# Patient Record
Sex: MALE | Race: WHITE | NOT HISPANIC OR LATINO | Employment: OTHER | URBAN - METROPOLITAN AREA
[De-identification: names, ages, dates, MRNs, and addresses within clinical notes are randomized per-mention and may not be internally consistent; named-entity substitution may affect disease eponyms.]

---

## 2019-06-03 PROBLEM — L98.9 SKIN LESION OF RIGHT ARM: Status: ACTIVE | Noted: 2019-06-03

## 2019-06-03 PROCEDURE — 88305 TISSUE EXAM BY PATHOLOGIST: CPT | Performed by: PATHOLOGY

## 2019-06-18 PROCEDURE — 88305 TISSUE EXAM BY PATHOLOGIST: CPT | Performed by: PATHOLOGY

## 2019-07-06 PROBLEM — L98.9 SKIN LESION OF RIGHT ARM: Status: RESOLVED | Noted: 2019-06-03 | Resolved: 2019-07-06

## 2019-11-13 ENCOUNTER — EVALUATION (OUTPATIENT)
Dept: PHYSICAL THERAPY | Age: 74
End: 2019-11-13
Payer: COMMERCIAL

## 2019-11-13 DIAGNOSIS — M25.551 RIGHT HIP PAIN: Primary | ICD-10-CM

## 2019-11-13 PROCEDURE — 97162 PT EVAL MOD COMPLEX 30 MIN: CPT | Performed by: PHYSICAL THERAPIST

## 2019-11-13 NOTE — PROGRESS NOTES
PT Evaluation     Today's date: 2019  Patient name: Ofe Govea  :   MRN: [id number]  Referring provider: Maddy Michael MD  Dx:   Encounter Diagnosis     ICD-10-CM    1  Right hip pain M25 551                   Assessment  Assessment details: Severe R hip PROM/AROM limitations - needs further medical intervention  Scheduled to R THR and f/u PT 3 days later depending on recovery  Impairments: abnormal or restricted ROM, impaired physical strength, lacks appropriate home exercise program and pain with function  Understanding of Dx/Px/POC: excellent  Goals  Short Term goals - 4 weeks  1  Patient will be independent HEP  2   Patient will report a 50% decrease in pain complaints  3   Increase strength 1/2 grade  4   Increase ROM 5-10 degrees  Long Term goals - 8 weeks  1  Patient will report elimination of pain complaints  2   Patient will return to all work related activities without restriction  3   Patient will return to all recreational activities without restriction  4   ROM WFL  5   Strength 5/5  Plan  Planned therapy interventions: manual therapy and strengthening  Frequency: 3x week  Duration in weeks: 6        Subjective Evaluation    History of Present Illness  Mechanism of injury: Patient scheduled for R THR on 19  Patient had L THR approx 2 years ago      Quality of life: excellent    Pain  Current pain ratin  At best pain ratin  At worst pain ratin  Quality: sharp  Aggravating factors: stair climbing, standing and walking  Progression: worsening    Patient Goals  Patient goals for therapy: increased strength, independence with ADLs/IADLs, increased motion and decreased pain          Objective     Passive Range of Motion     Right Hip   Flexion: 80 degrees with pain  Extension: -5 degrees with pain  Abduction: 0 degrees with pain  External rotation (prone): 15 degrees with pain  Internal rotation (prone): -10 degrees with pain    Strength/Myotome Testing     Right Hip   Planes of Motion   Flexion: 2+  Abduction: 2+  External rotation: 2+  Internal rotation: 2+             Precautions: Anterior THR precautions      Manual                           LC             Prone quad stretch                                           Exercise Diary                                        SLR flexion             SLR abd                          Heel slides with hip flexion                          Standing hip flexion             Standing hip abd                          Standing knee flexion                                                                                                                                      Modalities

## 2020-01-10 ENCOUNTER — OFFICE VISIT (OUTPATIENT)
Dept: PHYSICAL THERAPY | Age: 75
End: 2020-01-10
Payer: COMMERCIAL

## 2020-01-10 DIAGNOSIS — M25.551 RIGHT HIP PAIN: Primary | ICD-10-CM

## 2020-01-10 PROCEDURE — 97164 PT RE-EVAL EST PLAN CARE: CPT | Performed by: PHYSICAL THERAPIST

## 2020-01-10 PROCEDURE — 97110 THERAPEUTIC EXERCISES: CPT | Performed by: PHYSICAL THERAPIST

## 2020-01-10 NOTE — PROGRESS NOTES
Daily Note     Today's date: 1/10/2020  Patient name: Marco Marin  :   MRN: [id number]  Referring provider: Eva Anglin MD  Dx:   Encounter Diagnosis     ICD-10-CM    1  Right hip pain M25 551                   Subjective: Significant stiffness noted  Objective: See treatment diary below      Assessment: Tolerated treatment fair  Patient would benefit from continued PT      Plan: Continue per plan of care        Precautions: Anterior THR precautions      Manual  1/10                         LC nt            Prone quad stretch nt                                          Exercise Diary                                        SLR flexion nt            SLR abd nt                         Heel slides with hip flexion Hell slides with strap - 2x10                         Standing hip flexion nt            Standing hip abd nt                         Standing knee flexion nt                         Supine hip abd 2x10            Bridging 2x10            Isometric hip add 2x10            Parallel bars sidestepping             Fwd/bck             squats - 2x10                                          Modalities

## 2020-01-13 ENCOUNTER — OFFICE VISIT (OUTPATIENT)
Dept: PHYSICAL THERAPY | Age: 75
End: 2020-01-13
Payer: COMMERCIAL

## 2020-01-13 DIAGNOSIS — M25.551 RIGHT HIP PAIN: Primary | ICD-10-CM

## 2020-01-13 PROCEDURE — 97110 THERAPEUTIC EXERCISES: CPT | Performed by: PHYSICAL THERAPIST

## 2020-01-13 PROCEDURE — 97140 MANUAL THERAPY 1/> REGIONS: CPT | Performed by: PHYSICAL THERAPIST

## 2020-01-13 PROCEDURE — 97112 NEUROMUSCULAR REEDUCATION: CPT | Performed by: PHYSICAL THERAPIST

## 2020-01-13 NOTE — PROGRESS NOTES
Daily Note     Today's date: 2020  Patient name: Heaven Singh  :   MRN: [id number]  Referring provider: Huong Love MD  Dx:   Encounter Diagnosis     ICD-10-CM    1  Right hip pain M25 551                   Subjective: Stiffness noted      Objective: See treatment diary below      Assessment: Tolerated treatment well  Patient would benefit from continued PT      Plan: Continue per plan of care  Precautions: Anterior THR precautions      Manual  1/10 1/13                        LC nt Nustep-10 minutes           Prone quad stretch nt x5 reps hold 30 sec                                           Exercise Diary                Prone knee flexion - 3x10                        SLR flexion nt            SLR abd nt                         Heel slides with hip flexion Hell slides with strap - 2x10 2x10                        Standing hip flexion nt nt           Standing hip abd nt nt                        Standing knee flexion nt nt                        Supine hip abd 2x10 3x10           Bridging 2x10 3x10           Isometric hip add 2x10 3x10           Parallel bars sidestepping 3x10            Fwd/bck             squats - 2x10              Step-ups - 3x10                            Modalities Imiquimod Counseling:  I discussed with the patient the risks of imiquimod including but not limited to erythema, scaling, itching, weeping, crusting, and pain.  Patient understands that the inflammatory response to imiquimod is variable from person to person and was educated regarded proper titration schedule.  If flu-like symptoms develop, patient knows to discontinue the medication and contact us.

## 2020-01-15 ENCOUNTER — OFFICE VISIT (OUTPATIENT)
Dept: PHYSICAL THERAPY | Age: 75
End: 2020-01-15
Payer: COMMERCIAL

## 2020-01-15 DIAGNOSIS — M25.551 RIGHT HIP PAIN: Primary | ICD-10-CM

## 2020-01-15 PROCEDURE — 97140 MANUAL THERAPY 1/> REGIONS: CPT | Performed by: PHYSICAL THERAPIST

## 2020-01-15 PROCEDURE — 97110 THERAPEUTIC EXERCISES: CPT | Performed by: PHYSICAL THERAPIST

## 2020-01-15 NOTE — PROGRESS NOTES
Daily Note     Today's date: 1/15/2020  Patient name: Carole Ferguson  :   MRN: [id number]  Referring provider: Joe Luu MD  Dx:   Encounter Diagnosis     ICD-10-CM    1  Right hip pain M25 551                   Subjective: Feeling better      Objective: See treatment diary below      Assessment: Tolerated treatment well  Patient would benefit from continued PT      Plan: Continue per plan of care  Precautions: Anterior THR precautions      Manual  1/10 1/13 1/15                       LC nt Nustep-10 minutes x10 minutes          Prone quad stretch nt x5 reps hold 30 sec   x5 - gentle                                        Exercise Diary                Prone knee flexion - 3x10 3x10                       SLR flexion nt            SLR abd nt                         Heel slides with hip flexion Hell slides with strap - 2x10 2x10 3x10                       Standing hip flexion nt nt           Standing hip abd nt nt                        Standing knee flexion nt nt                        Supine hip abd 2x10 3x10 3x10          Bridging 2x10 3x10 3x10          Isometric hip add 2x10 3x10 3x10          Parallel bars sidestepping 3x10 3x10           Fwd/bck             squats - 2x10              Step-ups - 3x10 3x10                           Modalities

## 2020-01-17 ENCOUNTER — OFFICE VISIT (OUTPATIENT)
Dept: PHYSICAL THERAPY | Age: 75
End: 2020-01-17
Payer: COMMERCIAL

## 2020-01-17 DIAGNOSIS — M25.551 RIGHT HIP PAIN: Primary | ICD-10-CM

## 2020-01-17 PROCEDURE — 97110 THERAPEUTIC EXERCISES: CPT | Performed by: PHYSICAL THERAPIST

## 2020-01-17 NOTE — PROGRESS NOTES
Daily Note     Today's date: 2020  Patient name: Damian Coronel  :   MRN: [id number]  Referring provider: Jose Francisco Cormier MD  Dx:   Encounter Diagnosis     ICD-10-CM    1  Right hip pain M25 551                   Subjective: Significant increase in sx's after last visit - mostly LB stiffness      Objective: See treatment diary below      Assessment: Tolerated treatment well  Patient would benefit from continued PT      Plan: Continue per plan of care  Precautions: Anterior THR precautions      Manual  1/10 1/13 1/15 1/17                      LC nt Nustep-10 minutes x10 minutes x10 minutes         Prone quad stretch nt x5 reps hold 30 sec   x5 - gentle nt                                       Exercise Diary                Prone knee flexion - 3x10 3x10 nt                      SLR flexion nt            SLR abd nt                         Heel slides with hip flexion Hell slides with strap - 2x10 2x10 3x10 completed                      Standing hip flexion nt nt  nt         Standing hip abd nt nt  nt                      Standing knee flexion nt nt  nt                      Supine hip abd 2x10 3x10 3x10 3x10         Bridging 2x10 3x10 3x10 3x10         Isometric hip add 2x10 3x10 3x10 3x10         Parallel bars sidestepping 3x10 3x10 3x10          Fwd/bck             squats - 2x10              Step-ups - 3x10 3x10 3x10                          Modalities

## 2020-01-20 ENCOUNTER — OFFICE VISIT (OUTPATIENT)
Dept: PHYSICAL THERAPY | Age: 75
End: 2020-01-20
Payer: COMMERCIAL

## 2020-01-20 DIAGNOSIS — M25.551 RIGHT HIP PAIN: Primary | ICD-10-CM

## 2020-01-20 PROCEDURE — 97110 THERAPEUTIC EXERCISES: CPT | Performed by: PHYSICAL THERAPIST

## 2020-01-20 PROCEDURE — 97112 NEUROMUSCULAR REEDUCATION: CPT | Performed by: PHYSICAL THERAPIST

## 2020-01-20 NOTE — PROGRESS NOTES
Daily Note     Today's date: 2020  Patient name: Nancy Vilchis  :   MRN: [id number]  Referring provider: Benoit Manriquez MD  Dx:   Encounter Diagnosis     ICD-10-CM    1  Right hip pain M25 551                   Subjective: Anterior hip tightness b/l and posterior R hip soreness noted  Objective: See treatment diary below      Assessment: Tolerated treatment well  Patient would benefit from continued PT      Plan: Continue per plan of care  Precautions: Anterior THR precautions      Manual  1/10 1/13 1/15 1/17 1/20                     LC nt Nustep-10 minutes x10 minutes x10 minutes x10 minutes        Prone quad stretch nt x5 reps hold 30 sec   x5 - gentle nt nt                                      Exercise Diary                Prone knee flexion - 3x10 3x10 nt nt                     SLR flexion nt    standing at parallel bars        SLR abd nt    standing at parallel bars                     Heel slides with hip flexion Hell slides with strap - 2x10 2x10 3x10 completed 3x10                                                                                      Supine hip abd 2x10 3x10 3x10 3x10 3x10        Bridging 2x10 3x10 3x10 3x10 3x10        Isometric hip add 2x10 3x10 3x10 3x10 3x10        Parallel bars sidestepping 3x10 3x10 3x10 3x10         Fwd/bck             squats - 2x10              Step-ups - 3x10 3x10 3x10 3x10                         Modalities

## 2020-01-22 ENCOUNTER — OFFICE VISIT (OUTPATIENT)
Dept: PHYSICAL THERAPY | Age: 75
End: 2020-01-22
Payer: COMMERCIAL

## 2020-01-22 DIAGNOSIS — M25.551 RIGHT HIP PAIN: Primary | ICD-10-CM

## 2020-01-22 PROCEDURE — 97140 MANUAL THERAPY 1/> REGIONS: CPT | Performed by: PHYSICAL THERAPIST

## 2020-01-22 PROCEDURE — 97110 THERAPEUTIC EXERCISES: CPT | Performed by: PHYSICAL THERAPIST

## 2020-01-22 NOTE — PROGRESS NOTES
Daily Note     Today's date: 2020  Patient name: Ofe Govea  :   MRN: [id number]  Referring provider: Maddy Michael MD  Dx:   Encounter Diagnosis     ICD-10-CM    1  Right hip pain M25 551                   Subjective: Feeling better      Objective: See treatment diary below  Staples removed - incision is approx along entire length  No redness noted  No significant bleeding noted  PROM:  R hip flexion: 90, ext: -5, IR: 10, ER: 10   Strength:  R hip flexion 2+/5, abd 3/5, ext 3+/5  Gait:  Using SPC - deviations noted  Subjective sx's:  Anterior tightness, soreness, and pain persist - 5/10 at worse    Short Term goals - 4 weeks  1  Patient will be independent HEP  2   Patient will report a 50% decrease in pain complaints  3   Increase strength 1/2 grade  4   Increase ROM 5-10 degrees  Long Term goals - 8 weeks  1  Patient will report elimination of pain complaints  2   Patient will return to all work related activities without restriction  3   Patient will return to all recreational activities without restriction  4   ROM WFL  5   Strength 5/5  Assessment: Tolerated treatment well  Patient would benefit from continued PT      Plan: Continue per plan of care  Precautions: Anterior THR precautions      Manual  1/10 1/13 1/15 1/17 1/20 1/22                    LC nt Nustep-10 minutes x10 minutes x10 minutes x10 minutes x10 minutes       Prone quad stretch nt x5 reps hold 30 sec   x5 - gentle nt nt x5 reps                                     Exercise Diary                Prone knee flexion - 3x10 3x10 nt nt                     SLR flexion nt    standing at parallel bars 3x10       SLR abd nt    standing at parallel bars 3x10             SLS on blue foam - 10 reps       Heel slides with hip flexion Hell slides with strap - 2x10 2x10 3x10 completed 3x10 3x10                                                                                     Supine hip abd 2x10 3x10 3x10 3x10 3x10 hooklying       Bridging 2x10 3x10 3x10 3x10 3x10 3x10       Isometric hip add 2x10 3x10 3x10 3x10 3x10 3x10       Parallel bars sidestepping 3x10 3x10 3x10 3x10 3x10        Fwd/bck             squats - 2x10              Step-ups - 3x10 3x10 3x10 3x10 3x10                        Modalities

## 2020-01-29 ENCOUNTER — OFFICE VISIT (OUTPATIENT)
Dept: PHYSICAL THERAPY | Age: 75
End: 2020-01-29
Payer: COMMERCIAL

## 2020-01-29 DIAGNOSIS — M25.551 RIGHT HIP PAIN: Primary | ICD-10-CM

## 2020-01-29 NOTE — PROGRESS NOTES
Daily Note     Today's date: 2020  Patient name: Brigida Lee  :   MRN: [id number]  Referring provider: Art Licona MD  Dx:   Encounter Diagnosis     ICD-10-CM    1  Right hip pain M25 551                   Subjective: Feeling good  Objective: See treatment diary below      Assessment: Tolerated treatment well  Patient would benefit from continued PT      Plan: Continue per plan of care  Precautions: Anterior THR precautions      Manual  1/10 1/13 1/15 1/17 1/20 1/22 1/29                   LC nt Nustep-10 minutes x10 minutes x10 minutes x10 minutes x10 minutes x10 minutes      Prone quad stretch nt x5 reps hold 30 sec   x5 - gentle nt nt x5 reps x5             Gentle MRE's abd - 2x12                       Exercise Diary                Prone knee flexion - 3x10 3x10 nt nt  3x10                   SLR flexion nt    standing at parallel bars 3x10 3# - 3x10      SLR abd nt    standing at parallel bars 3x10 3# - 3x10            SLS on blue foam - 10 reps x10 reps      Heel slides with hip flexion Hell slides with strap - 2x10 2x10 3x10 completed 3x10 3x10 3x10                                                                                    Supine hip abd 2x10 3x10 3x10 3x10 3x10 hooklying 3x10      Bridging 2x10 3x10 3x10 3x10 3x10 3x10 3x10      Isometric hip add 2x10 3x10 3x10 3x10 3x10 3x10 3x10      Parallel bars sidestepping 3x10 3x10 3x10 3x10 3x10 nt       Fwd/bck             squats - 2x10              Step-ups - 3x10 3x10 3x10 3x10 3x10 3x10                       Modalities

## 2020-01-31 ENCOUNTER — OFFICE VISIT (OUTPATIENT)
Dept: PHYSICAL THERAPY | Age: 75
End: 2020-01-31
Payer: COMMERCIAL

## 2020-01-31 DIAGNOSIS — M25.551 RIGHT HIP PAIN: Primary | ICD-10-CM

## 2020-01-31 PROCEDURE — 97140 MANUAL THERAPY 1/> REGIONS: CPT | Performed by: PHYSICAL THERAPIST

## 2020-01-31 PROCEDURE — 97110 THERAPEUTIC EXERCISES: CPT | Performed by: PHYSICAL THERAPIST

## 2020-01-31 NOTE — PROGRESS NOTES
Daily Note     Today's date: 2020  Patient name: Bola Patterson  :   MRN: [id number]  Referring provider: Nacho Talavera MD  Dx:   Encounter Diagnosis     ICD-10-CM    1  Right hip pain M25 551                 Subjective: Stiff when first getting up      Objective: See treatment diary below      Assessment: Tolerated treatment well  Patient would benefit from continued PT      Plan: Continue per plan of care  Precautions: Anterior THR precautions      Manual  1/10 1/13 1/15 1/17 1/20 1/22 1/29 1/31                  LC nt Nustep-10 minutes x10 minutes x10 minutes x10 minutes x10 minutes x10 minutes x10 minutes     Prone quad stretch nt x5 reps hold 30 sec   x5 - gentle nt nt x5 reps x5 x5            Gentle MRE's abd - 2x12 2x12                      Exercise Diary                Prone knee flexion - 3x10 3x10 nt nt  3x10 3x10                  SLR flexion nt    standing at parallel bars 3x10 3# - 3x10 3#     SLR abd nt    standing at parallel bars 3x10 3# - 3x10 3#           SLS on blue foam - 10 reps x10 reps x10     Heel slides with hip flexion Hell slides with strap - 2x10 2x10 3x10 completed 3x10 3x10 3x10 3x10                                                                                   Supine hip abd 2x10 3x10 3x10 3x10 3x10 hooklying 3x10 3x10     Bridging 2x10 3x10 3x10 3x10 3x10 3x10 3x10 3x10     Isometric hip add 2x10 3x10 3x10 3x10 3x10 3x10 3x10 3x10     Parallel bars sidestepping 3x10 3x10 3x10 3x10 3x10 nt nt      Fwd/bck             squats - 2x10              Step-ups - 3x10 3x10 3x10 3x10 3x10 3x10 3x10                      Modalities

## 2020-02-03 ENCOUNTER — APPOINTMENT (OUTPATIENT)
Dept: PHYSICAL THERAPY | Age: 75
End: 2020-02-03
Payer: COMMERCIAL

## 2020-02-04 ENCOUNTER — OFFICE VISIT (OUTPATIENT)
Dept: PHYSICAL THERAPY | Age: 75
End: 2020-02-04
Payer: COMMERCIAL

## 2020-02-04 DIAGNOSIS — M25.551 RIGHT HIP PAIN: Primary | ICD-10-CM

## 2020-02-04 PROCEDURE — 97110 THERAPEUTIC EXERCISES: CPT | Performed by: PHYSICAL THERAPIST

## 2020-02-04 PROCEDURE — 97140 MANUAL THERAPY 1/> REGIONS: CPT | Performed by: PHYSICAL THERAPIST

## 2020-02-04 NOTE — PROGRESS NOTES
Daily Note     Today's date: 2020  Patient name: Ayah Mattson  :   MRN: [id number]  Referring provider: Raven Ko MD  Dx:   Encounter Diagnosis     ICD-10-CM    1  Right hip pain M25 551                   Subjective: Feeling good  Objective: See treatment diary below      Assessment: Tolerated treatment well  Patient would benefit from continued PT      Plan: Continue per plan of care  Precautions: Anterior THR precautions      Manual  1/10 1/13 1/15 1/17 1/20 1/22 1/29 1/31 2                 LC nt Nustep-10 minutes x10 minutes x10 minutes x10 minutes x10 minutes x10 minutes x10 minutes 10 minutes    Prone quad stretch nt x5 reps hold 30 sec   x5 - gentle nt nt x5 reps x5 x5 x5           Gentle MRE's abd - 2x12 2x12 2x12             SLR with assist - 3x5        Exercise Diary                Prone knee flexion - 3x10 3x10 nt nt  3x10 3x10 3x10                 SLR flexion nt    standing at parallel bars 3x10 3# - 3x10 3# nt    SLR abd nt    standing at parallel bars 3x10 3# - 3x10 3# nt          SLS on blue foam - 10 reps x10 reps x10 x10 reps    Heel slides with hip flexion Hell slides with strap - 2x10 2x10 3x10 completed 3x10 3x10 3x10 3x10 nt                                                                                  Supine hip abd 2x10 3x10 3x10 3x10 3x10 hooklying 3x10 3x10 nt    Bridging 2x10 3x10 3x10 3x10 3x10 3x10 3x10 3x10 3x10    Isometric hip add 2x10 3x10 3x10 3x10 3x10 3x10 3x10 3x10 nt    Parallel bars sidestepping 3x10 3x10 3x10 3x10 3x10 nt nt nt     Fwd/bck             squats - 2x10              Step-ups - 3x10 3x10 3x10 3x10 3x10 3x10 3x10 3x10             Biodex - 2x10 squat and hold        Modalities

## 2020-02-06 ENCOUNTER — OFFICE VISIT (OUTPATIENT)
Dept: PHYSICAL THERAPY | Age: 75
End: 2020-02-06
Payer: COMMERCIAL

## 2020-02-06 DIAGNOSIS — M25.551 RIGHT HIP PAIN: Primary | ICD-10-CM

## 2020-02-06 PROCEDURE — 97140 MANUAL THERAPY 1/> REGIONS: CPT | Performed by: PHYSICAL THERAPIST

## 2020-02-06 PROCEDURE — 97110 THERAPEUTIC EXERCISES: CPT | Performed by: PHYSICAL THERAPIST

## 2020-02-06 NOTE — PROGRESS NOTES
Daily Note     Today's date: 2020  Patient name: Heaven Singh  :   MRN: [id number]  Referring provider: Huong Love MD  Dx:   Encounter Diagnosis     ICD-10-CM    1  Right hip pain M25 551                   Subjective: Patient continues to note improvement  Objective: See treatment diary below      Assessment: Tolerated treatment well  Patient would benefit from continued PT      Plan: Continue per plan of care  Precautions: Anterior THR precautions      Manual  1/10 1/13 1/15 1/17 1/20 1/22 1/29 1/31 2 2/                LC nt Nustep-10 minutes x10 minutes x10 minutes x10 minutes x10 minutes x10 minutes x10 minutes 10 minutes x10 minutes   Prone quad stretch nt x5 reps hold 30 sec   x5 - gentle nt nt x5 reps x5 x5 x5 completed          Gentle MRE's abd - 2x12 2x12 2x12 2x12            SLR with assist - 3x5 completed       Exercise Diary                Prone knee flexion - 3x10 3x10 nt nt  3x10 3x10 3x10 With active hip ext after stretch - 2x15             SLR abd - 2x15                                   SLS on blue foam - 10 reps x10 reps x10 x10 reps x10 reps - hold 10 sec                                                                                                           Bridging 2x10 3x10 3x10 3x10 3x10 3x10 3x10 3x10 3x10 With single limb lowering - 3x10   Isometric hip add 2x10 3x10 3x10 3x10 3x10 3x10 3x10 3x10 nt 3x10 with ball                                            Step-ups - 3x10 3x10 3x10 3x10 3x10 3x10 3x10 3x10 3x10 - 8"            Biodex - 2x10 squat and hold completed       Modalities

## 2020-02-10 ENCOUNTER — OFFICE VISIT (OUTPATIENT)
Dept: PHYSICAL THERAPY | Age: 75
End: 2020-02-10
Payer: COMMERCIAL

## 2020-02-10 DIAGNOSIS — M25.551 RIGHT HIP PAIN: Primary | ICD-10-CM

## 2020-02-10 PROCEDURE — 97110 THERAPEUTIC EXERCISES: CPT

## 2020-02-10 PROCEDURE — 97140 MANUAL THERAPY 1/> REGIONS: CPT

## 2020-02-10 PROCEDURE — 97112 NEUROMUSCULAR REEDUCATION: CPT

## 2020-02-10 NOTE — PROGRESS NOTES
Daily Note     Today's date: 2/10/2020  Patient name: Ayse Justice  :   MRN: [id number]  Referring provider: Aviva Harmon MD  Dx:   Encounter Diagnosis     ICD-10-CM    1  Right hip pain M25 551                   Subjective: Pt states he is feeling pretty good over all with no new complaints from last session  Objective: See treatment diary below      Assessment: Tolerated treatment well with no increase in pain  Pt continues to required min to moderate assistance with SLR  Pt demonstrates improve balance as he demonstrates less LOB with SLS  Patient would benefit from continued PT      Plan: Continue per plan of care        Precautions: Anterior THR precautions      Manual  1/17 1/20 1/22 1/29 1/31 2/4 2/6 2/10              LC x10 minutes x10 minutes x10 minutes x10 minutes x10 minutes 10 minutes x10 minutes 10 mins   Prone quad stretch nt nt x5 reps x5 x5 x5 completed completed        Gentle MRE's abd - 2x12 2x12 2x12 2x12 2  x12          SLR with assist - 3x5 completed completed        Exercise Diary             nt nt  3x10 3x10 3x10 With active hip ext after stretch - 2x15 Prone knee flexion   with active hip ext after stretch - 2x15          SLR abd - 2x15 SLR abd - 2x15                            SLS on blue foam - 10 reps x10 reps x10 x10 reps x10 reps - hold 10 sec x10 reps - hold 10 sec                                                                                           Bridging 3x10 3x10 3x10 3x10 3x10 3x10 With single limb lowering - 3x10 With single limb lowering - 3x10   Isometric hip add 3x10 3x10 3x10 3x10 3x10 nt 3x10 with ball 3x10 with ball                                     3x10 3x10 3x10 3x10 3x10 3x10 3x10 - 8" 3  x10 8"         Biodex - 2x10 squat and hold completed completed        Modalities

## 2020-02-13 ENCOUNTER — APPOINTMENT (OUTPATIENT)
Dept: PHYSICAL THERAPY | Age: 75
End: 2020-02-13
Payer: COMMERCIAL

## 2022-01-18 ENCOUNTER — CONSULT (OUTPATIENT)
Dept: SURGERY | Facility: CLINIC | Age: 77
End: 2022-01-18
Payer: COMMERCIAL

## 2022-01-18 VITALS — BODY MASS INDEX: 27.2 KG/M2 | HEIGHT: 70 IN | WEIGHT: 190 LBS

## 2022-01-18 DIAGNOSIS — L98.9 ARM SKIN LESION, RIGHT: Primary | ICD-10-CM

## 2022-01-18 PROCEDURE — 88305 TISSUE EXAM BY PATHOLOGIST: CPT | Performed by: PATHOLOGY

## 2022-01-18 PROCEDURE — 11604 EXC TR-EXT MAL+MARG 3.1-4 CM: CPT | Performed by: SURGERY

## 2022-01-18 PROCEDURE — 99212 OFFICE O/P EST SF 10 MIN: CPT | Performed by: SURGERY

## 2022-01-18 RX ORDER — LISINOPRIL AND HYDROCHLOROTHIAZIDE 25; 20 MG/1; MG/1
1 TABLET ORAL DAILY
COMMUNITY
Start: 2021-12-01

## 2022-01-18 RX ORDER — CEPHALEXIN 500 MG/1
CAPSULE ORAL
COMMUNITY

## 2022-01-18 RX ORDER — DOXYCYCLINE HYCLATE 100 MG/1
CAPSULE ORAL
COMMUNITY
Start: 2021-12-20

## 2022-01-18 RX ORDER — PRAVASTATIN SODIUM 40 MG
40 TABLET ORAL DAILY
COMMUNITY
Start: 2021-12-01

## 2022-01-18 RX ORDER — SILDENAFIL 50 MG/1
TABLET, FILM COATED ORAL DAILY
COMMUNITY
Start: 2022-01-17

## 2022-01-18 NOTE — PROGRESS NOTES
Assessment/Plan:  Patient presents with 2 skin lesions over the right arm and 3 skin lesions over the left arm  This is in a background of extensive solar keratosis of the skin  The skin lesions are raised and red  They are firm mildly tender  After consultation, 2 of these lesions or selected for excision  Both measure 8 mm in size  They were contiguous, and therefore a 4 cm total excision was completed  Alcohol prep was utilized  1% lidocaine with epinephrine was infiltrated  This was carried down through skin full-thickness tissue  The subcutaneous tissues were excised  Hemostasis was assured using electrocautery  Complex repair was completed using electrocautery to undermine the skin in all directions in order to complete closure  3-0 Vicryl subcutaneous followed by 4 Monocryl suture was applied  Dermabond was utilized  The patient tolerated this well  Follow up in 2 weeks is requested  Diagnoses and all orders for this visit:    Arm skin lesion, right  -     Tissue Exam; Future  -     Tissue Exam    Other orders  -     cephalexin (KEFLEX) 500 mg capsule; cephalexin 500 mg capsule  -     doxycycline hyclate (VIBRAMYCIN) 100 mg capsule  -     lisinopril-hydrochlorothiazide (PRINZIDE,ZESTORETIC) 20-25 MG per tablet; Take 1 tablet by mouth daily  -     pravastatin (PRAVACHOL) 40 mg tablet; Take 40 mg by mouth daily  -     sildenafil (Viagra) 50 MG tablet; Take by mouth Daily  -     Skin excision        Subjective:      Patient ID: Augusta Espana is a 68 y o  male  Patient presents for evaluation of 3 skin lesions on his left arm and 1 skin lesion on his right arm  He has had the lesions for 6 months  The skin lesions have increased in size and are painful  The following portions of the patient's history were reviewed and updated as appropriate:     He  has no past medical history on file  He  has no past surgical history on file  His family history is not on file    He  has no history on file for tobacco use, alcohol use, and drug use  Current Outpatient Medications   Medication Sig Dispense Refill    lisinopril-hydrochlorothiazide (PRINZIDE,ZESTORETIC) 20-25 MG per tablet Take 1 tablet by mouth daily      sildenafil (Viagra) 50 MG tablet Take by mouth Daily      cephalexin (KEFLEX) 500 mg capsule cephalexin 500 mg capsule      doxycycline hyclate (VIBRAMYCIN) 100 mg capsule       pravastatin (PRAVACHOL) 40 mg tablet Take 40 mg by mouth daily       No current facility-administered medications for this visit  He has No Known Allergies       Review of Systems      Objective:      Ht 5' 10" (1 778 m)   Wt 86 2 kg (190 lb)   BMI 27 26 kg/m²          Physical Exam  Skin excision    Date/Time: 1/18/2022 5:34 PM  Performed by: Lala Ayala MD  Authorized by: Lala Ayala MD   Universal Protocol:  Consent given by: patient  Patient understanding: patient states understanding of the procedure being performed      Procedure Details - Skin Excision:     Number of Lesions:  2  Lesion 1:     Body area:  Upper extremity    Upper extremity location:  L lower arm       Final defect size (mm):  40    Malignancy: malignancy unknown      Closure complexity: complex      Surgical defect:  40     Repair Comments:   After consultation, 2 of these lesions or selected for excision  Both measure 8 mm in size  They were contiguous, and therefore a 4 cm total excision was completed  Alcohol prep was utilized  1% lidocaine with epinephrine was infiltrated  This was carried down through skin full-thickness tissue  The subcutaneous tissues were excised  Hemostasis was assured using electrocautery  Complex repair was completed using electrocautery to undermine the skin in all directions in order to complete closure  3-0 Vicryl subcutaneous followed by 4 Monocryl suture was applied  Dermabond was utilized  The patient tolerated this well    Lesion 2:     Body area:  Upper extremity    Upper extremity location:  L lower arm        Malignancy: malignancy unknown      Closure complexity: complex         After consultation, 2 of these lesions or selected for excision  Both measure 8 mm in size  They were contiguous, and therefore a 4 cm total excision was completed  Alcohol prep was utilized  1% lidocaine with epinephrine was infiltrated  This was carried down through skin full-thickness tissue  The subcutaneous tissues were excised  Hemostasis was assured using electrocautery  Complex repair was completed using electrocautery to undermine the skin in all directions in order to complete closure  3-0 Vicryl subcutaneous followed by 4 Monocryl suture was applied  Dermabond was utilized  The patient tolerated this well

## 2022-01-31 NOTE — RESULT ENCOUNTER NOTE
Pathology results reported as "Squamous cell carcinoma, invasive, and adjacent hypertrophic actinic keratosis "  Margins are clear  He has additional areas of suspicious lesions over the arms  This will be re-evaluated at our next office visit

## 2022-02-01 ENCOUNTER — OFFICE VISIT (OUTPATIENT)
Dept: SURGERY | Facility: CLINIC | Age: 77
End: 2022-02-01
Payer: COMMERCIAL

## 2022-02-01 DIAGNOSIS — C44.629: Primary | ICD-10-CM

## 2022-02-01 PROCEDURE — 88305 TISSUE EXAM BY PATHOLOGIST: CPT | Performed by: PATHOLOGY

## 2022-02-01 PROCEDURE — 11602 EXC TR-EXT MAL+MARG 1.1-2 CM: CPT | Performed by: SURGERY

## 2022-02-01 PROCEDURE — NC001 PR NO CHARGE: Performed by: SURGERY

## 2022-02-01 NOTE — PROGRESS NOTES
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, left  -     Ambulatory Referral to Dermatology; Future  -     Tissue Exam; Future  -     Tissue Exam    Other orders  -     Skin excision  -     Biopsy        Pathology: Reviewed with patient, all questions answered  Postoperative restrictions reviewed  All questions answered  ______________________________________________________  HPI: Patient presents post operatively  Excision 2 skin lesions left arm 1/18/2022   Final Diagnosis  A  Skin, right arm, excision:  Squamous cell carcinoma, invasive, and adjacent hypertrophic actinic keratosis  See note  Note:  Squamous cell carcinoma does not extend to the margins  The hypertrophic actinic keratosis focally extends to one tip margin  Patient Active Problem List   Diagnosis    Arm skin lesion, right    Squamous cell cancer of multiple sites of skin of upper arm, left       Allergies:  Patient has no known allergies        Current Outpatient Medications:     cephalexin (KEFLEX) 500 mg capsule, cephalexin 500 mg capsule, Disp: , Rfl:     doxycycline hyclate (VIBRAMYCIN) 100 mg capsule, , Disp: , Rfl:     lisinopril-hydrochlorothiazide (PRINZIDE,ZESTORETIC) 20-25 MG per tablet, Take 1 tablet by mouth daily, Disp: , Rfl:     pravastatin (PRAVACHOL) 40 mg tablet, Take 40 mg by mouth daily, Disp: , Rfl:     sildenafil (Viagra) 50 MG tablet, Take by mouth Daily, Disp: , Rfl:     History reviewed  No pertinent past medical history  History reviewed  No pertinent surgical history  History reviewed  No pertinent family history  PHYSICAL EXAM    There were no vitals taken for this visit  General: normal, cooperative, no distress  Incision: clean, dry, and intact and healing well  Biopsy    Date/Time: 2/1/2022 5:35 PM  Performed by: Caty Ku MD  Authorized by: Caty Ku MD   Universal Protocol:  Consent: Verbal consent obtained  Patient understanding: patient states understanding of the procedure being performed      Procedure Details - Skin Biopsy:     Biopsy tissue type: skin and subcutaneous    Malignancy: malignant lesion      Skin cancer type: squamous cell carcinoma     Examination reveals a raised scaly skin lesion suspicious for squamous cell cancer over the left forearm  It measures 10 mm in size  Excision was completed with 4 mm margins  Complex repair was required undermining the skin in all directions in order to affect primary closure  This was completed with 3-0 Vicryl subcutaneous and 4-0 Monocryl suture  Dermabond was applied  Patient tolerated this procedure well            Caty Ku MD    Date: 2/1/2022 Time: 5:36 PM

## 2022-02-15 ENCOUNTER — OFFICE VISIT (OUTPATIENT)
Dept: SURGERY | Facility: CLINIC | Age: 77
End: 2022-02-15

## 2022-02-15 DIAGNOSIS — C44.629: Primary | ICD-10-CM

## 2022-02-15 PROCEDURE — 99024 POSTOP FOLLOW-UP VISIT: CPT | Performed by: SURGERY

## 2022-02-15 NOTE — PROGRESS NOTES
Assessment/Plan:  Patient presents postop excision of left arm squamous cell carcinoma  Margins are clear  Incisions well healed  Sutures removed  All questions answered  He has an appointment to see a dermatologist for further monitoring  There is extensive solar changes over the forearms and facial region  There are no diagnoses linked to this encounter  Pathology: Reviewed with patient, all questions answered  Postoperative restrictions reviewed  All questions answered  ______________________________________________________  HPI: Patient presents post operatively  Excision skin lesion left arm 2/1/2022   Final Diagnosis  A  Skin, left arm, excision:  Squamous cell carcinoma, invasive; margins free  ROS:  General ROS: negative for - chills, fatigue, fever or night sweats, weight loss  Respiratory ROS: no cough, shortness of breath, or wheezing  Cardiovascular ROS: no chest pain or dyspnea on exertion  Genito-Urinary ROS: no dysuria, trouble voiding, or hematuria  Musculoskeletal ROS: negative for - gait disturbance, joint pain or muscle pain  Neurological ROS: no TIA or stroke symptoms  GI ROS: see HPI  Skin ROS: no new rashes or lesions   Lymphatic ROS: no new adenopathy noted by pt  GYN ROS: see HPI, no new GYN history or bleeding noted  Psy ROS: no new mental or behavioral disturbances         Patient Active Problem List   Diagnosis    Arm skin lesion, right    Squamous cell cancer of multiple sites of skin of upper arm, left       Allergies:  Patient has no known allergies        Current Outpatient Medications:     cephalexin (KEFLEX) 500 mg capsule, cephalexin 500 mg capsule, Disp: , Rfl:     doxycycline hyclate (VIBRAMYCIN) 100 mg capsule, , Disp: , Rfl:     lisinopril-hydrochlorothiazide (PRINZIDE,ZESTORETIC) 20-25 MG per tablet, Take 1 tablet by mouth daily, Disp: , Rfl:     pravastatin (PRAVACHOL) 40 mg tablet, Take 40 mg by mouth daily, Disp: , Rfl:    sildenafil (Viagra) 50 MG tablet, Take by mouth Daily, Disp: , Rfl:     No past medical history on file  No past surgical history on file  No family history on file  PHYSICAL EXAM    There were no vitals taken for this visit      General: normal, cooperative, no distress  Abdominal: soft, nondistended or nontender  Incision: clean, dry, and intact and healing well      Kary Fountain MD    Date: 2/15/2022 Time: 9:54 AM